# Patient Record
Sex: FEMALE | Race: WHITE | NOT HISPANIC OR LATINO | Employment: UNEMPLOYED | URBAN - METROPOLITAN AREA
[De-identification: names, ages, dates, MRNs, and addresses within clinical notes are randomized per-mention and may not be internally consistent; named-entity substitution may affect disease eponyms.]

---

## 2017-05-17 ENCOUNTER — HISTORICAL (OUTPATIENT)
Dept: ADMINISTRATIVE | Facility: HOSPITAL | Age: 42
End: 2017-05-17

## 2017-09-20 ENCOUNTER — HISTORICAL (OUTPATIENT)
Dept: ORTHOPEDICS | Facility: CLINIC | Age: 42
End: 2017-09-20

## 2017-09-20 ENCOUNTER — HISTORICAL (OUTPATIENT)
Dept: RADIOLOGY | Facility: HOSPITAL | Age: 42
End: 2017-09-20

## 2022-04-10 ENCOUNTER — HISTORICAL (OUTPATIENT)
Dept: ADMINISTRATIVE | Facility: HOSPITAL | Age: 47
End: 2022-04-10

## 2022-04-26 VITALS
WEIGHT: 185.44 LBS | HEIGHT: 63 IN | SYSTOLIC BLOOD PRESSURE: 128 MMHG | DIASTOLIC BLOOD PRESSURE: 91 MMHG | BODY MASS INDEX: 32.86 KG/M2

## 2023-06-21 ENCOUNTER — HOSPITAL ENCOUNTER (EMERGENCY)
Facility: HOSPITAL | Age: 48
Discharge: HOME | End: 2023-06-21
Attending: INTERNAL MEDICINE

## 2023-06-21 VITALS
TEMPERATURE: 98 F | HEIGHT: 64 IN | SYSTOLIC BLOOD PRESSURE: 150 MMHG | DIASTOLIC BLOOD PRESSURE: 103 MMHG | BODY MASS INDEX: 31.99 KG/M2 | WEIGHT: 187.38 LBS | OXYGEN SATURATION: 96 % | HEART RATE: 94 BPM | RESPIRATION RATE: 18 BRPM

## 2023-06-21 DIAGNOSIS — S22.42XA CLOSED FRACTURE OF MULTIPLE RIBS OF LEFT SIDE, INITIAL ENCOUNTER: Primary | ICD-10-CM

## 2023-06-21 PROCEDURE — 25000003 PHARM REV CODE 250: Performed by: NURSE PRACTITIONER

## 2023-06-21 PROCEDURE — 99284 EMERGENCY DEPT VISIT MOD MDM: CPT | Mod: 25

## 2023-06-21 RX ORDER — KETOROLAC TROMETHAMINE 10 MG/1
10 TABLET, FILM COATED ORAL
Status: COMPLETED | OUTPATIENT
Start: 2023-06-21 | End: 2023-06-21

## 2023-06-21 RX ORDER — ACETAMINOPHEN AND CODEINE PHOSPHATE 300; 30 MG/1; MG/1
1 TABLET ORAL EVERY 8 HOURS PRN
Qty: 15 TABLET | Refills: 0 | Status: SHIPPED | OUTPATIENT
Start: 2023-06-21 | End: 2023-06-26

## 2023-06-21 RX ORDER — DICLOFENAC SODIUM 75 MG/1
75 TABLET, DELAYED RELEASE ORAL 2 TIMES DAILY
Qty: 14 TABLET | Refills: 0 | Status: SHIPPED | OUTPATIENT
Start: 2023-06-21 | End: 2023-06-28

## 2023-06-21 RX ADMIN — KETOROLAC TROMETHAMINE 10 MG: 10 TABLET, FILM COATED ORAL at 11:06

## 2023-06-21 NOTE — DISCHARGE INSTRUCTIONS
Follow up with [facility] as discussed to obtain a PCP.  Take pain medication as prescribed for no more than 7 days.  Present to nearest ED immediately for worsening chest pain or onset of SOB.  Present to a local Urgent Clinic or your PCP in 7 days for repeat chest x-ray.

## 2023-06-21 NOTE — ED PROVIDER NOTES
"Encounter Date: 6/21/2023       History     Chief Complaint   Patient presents with    Rib Injury     PT SEEN AT  YESTERDAY AND REFERRED TO ER FOR FURTHER EVAL OF POSSIBLE LT HEMOTHORAX AND BROKEN RIBS FROM FALL ON Friday. PT DENIES SOB.      Pt is a 47 y.o. female who presents to the [facility] ED for evaluation of her Lt chest and ribs. Pt reports tripping over her daughter's cat on Friday, landing on her side. Denies hitting her head, LOC, SOB, weakness, or dizziness. Due to continued pain, she went to a local Urgent Clinic who x-rayed the chest discovering multiple rib fractures. Pt admits to pain with deep breathing and movement. Says she was phoned by the clinic because the image "could not rule out a hemothorax." This call prompted today's ED visit. Denies chronic medical conditions.    Review of patient's allergies indicates:  No Known Allergies  History reviewed. No pertinent past medical history.  History reviewed. No pertinent surgical history.  History reviewed. No pertinent family history.  Social History     Tobacco Use    Smoking status: Former     Types: Cigarettes    Smokeless tobacco: Never     Review of Systems   Constitutional:  Negative for chills, diaphoresis, fatigue and fever.   HENT:  Negative for facial swelling, rhinorrhea, sinus pressure, sinus pain, sore throat and trouble swallowing.    Respiratory:  Negative for cough, chest tightness, shortness of breath and wheezing.    Cardiovascular:  Positive for chest pain. Negative for palpitations and leg swelling.   Gastrointestinal:  Negative for abdominal pain, diarrhea, nausea and vomiting.   Genitourinary:  Negative for dysuria, flank pain, frequency, hematuria and urgency.   Musculoskeletal:  Negative for arthralgias, back pain, joint swelling and myalgias.   Skin:  Negative for color change and rash.   Neurological:  Negative for dizziness, syncope, weakness and light-headedness.   Hematological:  Does not bruise/bleed easily.   All other " systems reviewed and are negative.    Physical Exam     Initial Vitals [06/21/23 1033]   BP Pulse Resp Temp SpO2   (!) 150/103 94 18 97.9 °F (36.6 °C) 96 %      MAP       --         Physical Exam    Nursing note and vitals reviewed.  Constitutional: She appears well-developed and well-nourished.   HENT:   Head: Normocephalic and atraumatic.   Nose: Nose normal.   Mouth/Throat: Oropharynx is clear and moist.   Eyes: Conjunctivae and EOM are normal. Pupils are equal, round, and reactive to light.   Neck: Neck supple.   Normal range of motion.  Cardiovascular:  Normal rate, regular rhythm, normal heart sounds and intact distal pulses.           Pulmonary/Chest: Effort normal and breath sounds normal. No respiratory distress. She has no wheezes. She has no rhonchi. She has no rales. Chest wall is not dull to percussion. She exhibits tenderness. She exhibits no crepitus, no edema, no deformity, no swelling and no retraction.     Abdominal: Abdomen is soft and flat. Bowel sounds are normal. She exhibits no distension. There is no abdominal tenderness. There is no rebound, no guarding, no tenderness at McBurney's point and negative Calderon's sign. negative psoas sign  Musculoskeletal:         General: Normal range of motion.      Cervical back: Normal range of motion and neck supple.     Neurological: She is alert and oriented to person, place, and time. She has normal strength and normal reflexes.   Skin: Skin is warm and dry. Capillary refill takes less than 2 seconds.   Psychiatric: She has a normal mood and affect. Her speech is normal and behavior is normal. Judgment and thought content normal.       ED Course   Procedures  Labs Reviewed - No data to display       Imaging Results              CT Chest Without Contrast (Final result)  Result time 06/21/23 13:12:55      Final result by Maritza Goodrich MD (06/21/23 13:12:55)                   Impression:      Findings seen consistent with acute left-sided rib  fractures with a fluid collection in the left pleural space.  Although the fluid collection is low attenuation hemothorax cannot be completely excluded but fluid collection is most likely reactive due to the low Hounsfield units.  Left lower lobe atelectasis    Hepatomegaly and hepatic steatosis      Electronically signed by: Maritza Goodrich  Date:    06/21/2023  Time:    13:12               Narrative:    EXAMINATION:  CT CHEST WITHOUT CONTRAST    CLINICAL HISTORY:  Chest trauma, blunt;Multiple rib fractures, concern for hemothorax;    TECHNIQUE:  Low dose axial images, sagittal and coronal reformations were obtained from the thoracic inlet to the lung bases. Contrast was not administered.  Automatic exposure control is utilized to reduce patient radiation exposure.    COMPARISON:  06/21/2023    FINDINGS:  There is a fluid collection in the left hemithorax.  There is some associated atelectasis as well.  Fluid collection has relatively low attenuation.  No pneumothorax is seen.  There is a fracture of the left 3rd, 4th and 5th ribs laterally.  There appear to be acute.  No other fractures are seen.  No sternal fracture is seen.  No spine fracture is seen.  No retrosternal hematoma is seen.  Thoracic aorta appears normal.  Heart appears normal.    There is some inflammatory changes seen along the left lateral chest wall consistent recent trauma.    Upper abdomen shows evidence of hepatomegaly and hepatic steatosis.                                       X-Ray Chest PA And Lateral (Final result)  Result time 06/21/23 11:30:26      Final result by Mukund Islas MD (06/21/23 11:30:26)                   Impression:      1. There is blunting of left costophrenic sulcus compatible with small amount of left pleural fluid, possibly related to hemothorax in the setting of trauma.  No visible pneumothorax is appreciated.  2. Left-sided rib fractures are more conspicuous on concurrent left rib series.      Electronically  signed by: Mukund Islas MD  Date:    06/21/2023  Time:    11:30               Narrative:    EXAMINATION:  XR CHEST PA AND LATERAL    CLINICAL HISTORY:  Fracture of one rib, left side, initial encounter for closed fracture.    COMPARISON:  None available.    FINDINGS:  Frontal and lateral views of the chest was obtained.  The cardiac silhouette is within normal limits for size. The aorta is mildly tortuous and partially calcified.  Low lung volumes are noted.  There is blunting of left costophrenic sulcus suggestive of trace left pleural fluid.  Hazy reticular opacities in left lung base may be related to atelectasis.  Osseous degenerative changes are seen.  Left posterior-lateral 3rd, 4th, and 5th rib fractures are more conspicuous on concurrent rib series.                                       X-Ray Ribs 2 View Left (Final result)  Result time 06/21/23 11:31:42      Final result by Mukund Islas MD (06/21/23 11:31:42)                   Impression:      Mildly displaced acute appearing posterior-lateral left 3rd, 4th, and 5th rib fractures are seen.    There is blunting of left costophrenic sulcus suggestive of trace left pleural fluid with hemothorax not excluded.  No visible pneumothorax is appreciated.      Electronically signed by: Mukund Islas MD  Date:    06/21/2023  Time:    11:31               Narrative:    EXAMINATION:  XR RIBS 2 VIEW LEFT    CLINICAL HISTORY:  Fracture of one rib, left side, initial encounter for closed fracture    COMPARISON:  Chest x-ray 06/21/2023    FINDINGS:  There appear to be mildly displaced posterior-lateral left 3rd, 4th, and 5th rib fractures.  There is blunting of left costophrenic sulcus suggestive of trace left pleural fluid with hemothorax not excluded.  Hazy and reticular left basilar opacities may be related to atelectasis.  No radiopaque foreign bodies are seen.                                       Medications   ketorolac tablet 10 mg (10 mg Oral Given 6/21/23 1101)      Medical Decision Making:   Differential Diagnosis:   Rib fracture  Hemothorax  Clinical Tests:   Radiological Study: Ordered and Reviewed  ED Management:  1:18 PM Reassessed patient at this time. Reports condition has improved. Discussed with patient all pertinent ED information and results. Discussed diagnosis and treatment plan with patient. Due to imaging read as low suspicion of hemothorax, I will be discharging pt at this time. Pt will go home with medication for pain control as well as an incentive spirometer to help facilitate lung ventilation. Pt will follow up with her PCP or present to nearest Urgent Clinic or ED in 1 week for repeat imaging to evaluate atelectasis for improvement. Discussed plan with pt. Follow up instructions and return to ED instruction have been given. All questions and concerns were addressed at this time. Patient voices understanding of information and instructions. Patient is comfortable with plan and discharge. Patient is stable for discharge.                           Clinical Impression:   Final diagnoses:  [S22.42XA] Closed fracture of multiple ribs of left side, initial encounter (Primary)        ED Disposition Condition    Discharge Stable          ED Prescriptions       Medication Sig Dispense Start Date End Date Auth. Provider    diclofenac (VOLTAREN) 75 MG EC tablet Take 1 tablet (75 mg total) by mouth 2 (two) times daily. for 7 days 14 tablet 6/21/2023 6/28/2023 Sage Carlisle Jr., DASHAWN    acetaminophen-codeine 300-30mg (TYLENOL #3) 300-30 mg Tab Take 1 tablet by mouth every 8 (eight) hours as needed (pain). 15 tablet 6/21/2023 6/26/2023 DASHAWN Zapata Jr.          Follow-up Information       Follow up With Specialties Details Why Contact Info    [facility] Dept Emergency Medicine In 3 days As needed, If symptoms worsen [address] [facility] [address] [phone]             Sage Carlisle Jr., DASHAWN  06/21/23 [id number]

## 2023-06-30 ENCOUNTER — OFFICE VISIT (OUTPATIENT)
Dept: URGENT CARE | Facility: CLINIC | Age: 48
End: 2023-06-30

## 2023-06-30 ENCOUNTER — HOSPITAL ENCOUNTER (OUTPATIENT)
Dept: RADIOLOGY | Facility: HOSPITAL | Age: 48
Discharge: HOME | End: 2023-06-30
Attending: FAMILY MEDICINE

## 2023-06-30 VITALS
TEMPERATURE: 98 F | OXYGEN SATURATION: 96 % | SYSTOLIC BLOOD PRESSURE: 133 MMHG | WEIGHT: 183.63 LBS | RESPIRATION RATE: 20 BRPM | HEART RATE: 99 BPM | HEIGHT: 63 IN | DIASTOLIC BLOOD PRESSURE: 97 MMHG | BODY MASS INDEX: 32.54 KG/M2

## 2023-06-30 DIAGNOSIS — S22.42XA CLOSED FRACTURE OF MULTIPLE RIBS OF LEFT SIDE, INITIAL ENCOUNTER: ICD-10-CM

## 2023-06-30 DIAGNOSIS — S22.42XA CLOSED FRACTURE OF MULTIPLE RIBS OF LEFT SIDE, INITIAL ENCOUNTER: Primary | ICD-10-CM

## 2023-06-30 PROCEDURE — 99214 OFFICE O/P EST MOD 30 MIN: CPT | Mod: PBBFAC,25 | Performed by: FAMILY MEDICINE

## 2023-06-30 PROCEDURE — 99203 OFFICE O/P NEW LOW 30 MIN: CPT | Mod: S$PBB,,, | Performed by: FAMILY MEDICINE

## 2023-06-30 PROCEDURE — 71046 X-RAY EXAM CHEST 2 VIEWS: CPT | Mod: TC

## 2023-06-30 PROCEDURE — 99203 PR OFFICE/OUTPT VISIT, NEW, LEVL III, 30-44 MIN: ICD-10-PCS | Mod: S$PBB,,, | Performed by: FAMILY MEDICINE

## 2023-06-30 RX ORDER — DICLOFENAC SODIUM 75 MG/1
75 TABLET, DELAYED RELEASE ORAL 2 TIMES DAILY
Qty: 30 TABLET | Refills: 1 | Status: SHIPPED | OUTPATIENT
Start: 2023-06-30

## 2023-06-30 NOTE — PROGRESS NOTES
"Subjective:      Patient ID: Carlita Briones is a 47 y.o. female.    Vitals:  height is 5' 2.6" (1.59 m) and weight is 83.3 kg (183 lb 10.3 oz). Her temperature is 98.4 °F (36.9 °C). Her blood pressure is 133/97 (abnormal) and her pulse is 99. Her respiration is 20 and oxygen saturation is 96%.     Chief Complaint: Rib Injury (States was seen in ER at [facility] last wed, needs repeat xray due to multiple rib fx and mass seen, was told to return for follow up )    Patient presents to urgent care for follow-up of left rib fractures and small pleural effusion per recent CT in the ER.  Being treated conservatively at home, using NSAIDs which are helping significantly with discomfort.  Continuing with incentive spirometer, states numbers are improving.  No shortness of breath, minimal cough, no hemoptysis, no sputum production.  No fever.  Chart reviewed.  Recent plain films and CT chest reviewed.    ROS   Objective:     Physical Exam   Constitutional: She appears well-developed.  Non-toxic appearance. She does not appear ill. No distress.   Cardiovascular: Regular rhythm.   Pulmonary/Chest: Effort normal and breath sounds normal. No respiratory distress. She has no decreased breath sounds. She has no wheezes. She has no rhonchi. She has no rales. She exhibits no tenderness, no bony tenderness, no crepitus, no deformity, no swelling and no retraction.   Abdominal: She exhibits no distension. Soft. There is no abdominal tenderness.   Musculoskeletal: Normal range of motion.         General: Normal range of motion.   Skin: Skin is warm, dry and not diaphoretic.   Nursing note and vitals reviewed.  CXR-radiology interpretation is pending.  Sharp left costophrenic angle.  No infiltrate  Assessment:     1. Closed fracture of multiple ribs of left side, initial encounter        Plan:   Patient clinically improving, radiology interpretation is pending.  She will continue incentive spirometer, refilled diclofenac.  Will notify if " radiology interpretation is different.  We discussed precautions that should prompt an urgent re-evaluation.  She will follow-up with her arranged provider in July.  Return to urgent care if any need    Closed fracture of multiple ribs of left side, initial encounter  -     XR CHEST PA AND LATERAL; Future; Expected date: 06/30/2023  -     diclofenac (VOLTAREN) 75 MG EC tablet; Take 1 tablet (75 mg total) by mouth 2 (two) times daily.  Dispense: 30 tablet; Refill: 1